# Patient Record
Sex: FEMALE | Race: WHITE | NOT HISPANIC OR LATINO | URBAN - METROPOLITAN AREA
[De-identification: names, ages, dates, MRNs, and addresses within clinical notes are randomized per-mention and may not be internally consistent; named-entity substitution may affect disease eponyms.]

---

## 2024-06-15 ENCOUNTER — HOSPITAL ENCOUNTER (EMERGENCY)
Facility: MEDICAL CENTER | Age: 29
End: 2024-06-15
Attending: STUDENT IN AN ORGANIZED HEALTH CARE EDUCATION/TRAINING PROGRAM

## 2024-06-15 VITALS
TEMPERATURE: 97.4 F | BODY MASS INDEX: 18.94 KG/M2 | RESPIRATION RATE: 16 BRPM | WEIGHT: 125 LBS | SYSTOLIC BLOOD PRESSURE: 119 MMHG | HEART RATE: 62 BPM | OXYGEN SATURATION: 99 % | DIASTOLIC BLOOD PRESSURE: 79 MMHG | HEIGHT: 68 IN

## 2024-06-15 DIAGNOSIS — R53.83 FATIGUE, UNSPECIFIED TYPE: ICD-10-CM

## 2024-06-15 LAB
ALBUMIN SERPL BCP-MCNC: 4.4 G/DL (ref 3.2–4.9)
ALBUMIN/GLOB SERPL: 1.7 G/DL
ALP SERPL-CCNC: 93 U/L (ref 30–99)
ALT SERPL-CCNC: 13 U/L (ref 2–50)
ANION GAP SERPL CALC-SCNC: 10 MMOL/L (ref 7–16)
APPEARANCE UR: CLEAR
AST SERPL-CCNC: 14 U/L (ref 12–45)
B-HCG SERPL-ACNC: <1 MIU/ML (ref 0–10)
BASOPHILS # BLD AUTO: 0.6 % (ref 0–1.8)
BASOPHILS # BLD: 0.04 K/UL (ref 0–0.12)
BILIRUB SERPL-MCNC: 0.2 MG/DL (ref 0.1–1.5)
BILIRUB UR QL STRIP.AUTO: NEGATIVE
BUN SERPL-MCNC: 17 MG/DL (ref 8–22)
CALCIUM ALBUM COR SERPL-MCNC: 8.9 MG/DL (ref 8.5–10.5)
CALCIUM SERPL-MCNC: 9.2 MG/DL (ref 8.4–10.2)
CHLORIDE SERPL-SCNC: 103 MMOL/L (ref 96–112)
CO2 SERPL-SCNC: 26 MMOL/L (ref 20–33)
COLOR UR: YELLOW
CREAT SERPL-MCNC: 0.58 MG/DL (ref 0.5–1.4)
EKG IMPRESSION: NORMAL
EOSINOPHIL # BLD AUTO: 0.33 K/UL (ref 0–0.51)
EOSINOPHIL NFR BLD: 5.1 % (ref 0–6.9)
ERYTHROCYTE [DISTWIDTH] IN BLOOD BY AUTOMATED COUNT: 43.8 FL (ref 35.9–50)
GFR SERPLBLD CREATININE-BSD FMLA CKD-EPI: 125 ML/MIN/1.73 M 2
GLOBULIN SER CALC-MCNC: 2.6 G/DL (ref 1.9–3.5)
GLUCOSE SERPL-MCNC: 82 MG/DL (ref 65–99)
GLUCOSE UR STRIP.AUTO-MCNC: NEGATIVE MG/DL
HCT VFR BLD AUTO: 45.1 % (ref 37–47)
HGB BLD-MCNC: 14.8 G/DL (ref 12–16)
IMM GRANULOCYTES # BLD AUTO: 0.01 K/UL (ref 0–0.11)
IMM GRANULOCYTES NFR BLD AUTO: 0.2 % (ref 0–0.9)
KETONES UR STRIP.AUTO-MCNC: NEGATIVE MG/DL
LEUKOCYTE ESTERASE UR QL STRIP.AUTO: NEGATIVE
LYMPHOCYTES # BLD AUTO: 2.54 K/UL (ref 1–4.8)
LYMPHOCYTES NFR BLD: 39.2 % (ref 22–41)
MCH RBC QN AUTO: 30 PG (ref 27–33)
MCHC RBC AUTO-ENTMCNC: 32.8 G/DL (ref 32.2–35.5)
MCV RBC AUTO: 91.3 FL (ref 81.4–97.8)
MICRO URNS: NORMAL
MONOCYTES # BLD AUTO: 0.38 K/UL (ref 0–0.85)
MONOCYTES NFR BLD AUTO: 5.9 % (ref 0–13.4)
NEUTROPHILS # BLD AUTO: 3.18 K/UL (ref 1.82–7.42)
NEUTROPHILS NFR BLD: 49 % (ref 44–72)
NITRITE UR QL STRIP.AUTO: NEGATIVE
NRBC # BLD AUTO: 0 K/UL
NRBC BLD-RTO: 0 /100 WBC (ref 0–0.2)
PH UR STRIP.AUTO: 7 [PH] (ref 5–8)
PLATELET # BLD AUTO: 219 K/UL (ref 164–446)
PMV BLD AUTO: 9.4 FL (ref 9–12.9)
POTASSIUM SERPL-SCNC: 4 MMOL/L (ref 3.6–5.5)
PROT SERPL-MCNC: 7 G/DL (ref 6–8.2)
PROT UR QL STRIP: NEGATIVE MG/DL
RBC # BLD AUTO: 4.94 M/UL (ref 4.2–5.4)
RBC UR QL AUTO: NEGATIVE
SODIUM SERPL-SCNC: 139 MMOL/L (ref 135–145)
SP GR UR STRIP.AUTO: 1.01
TROPONIN T SERPL-MCNC: <6 NG/L (ref 6–19)
WBC # BLD AUTO: 6.5 K/UL (ref 4.8–10.8)

## 2024-06-15 PROCEDURE — 80053 COMPREHEN METABOLIC PANEL: CPT

## 2024-06-15 PROCEDURE — 99284 EMERGENCY DEPT VISIT MOD MDM: CPT

## 2024-06-15 PROCEDURE — 700111 HCHG RX REV CODE 636 W/ 250 OVERRIDE (IP): Performed by: STUDENT IN AN ORGANIZED HEALTH CARE EDUCATION/TRAINING PROGRAM

## 2024-06-15 PROCEDURE — 96374 THER/PROPH/DIAG INJ IV PUSH: CPT

## 2024-06-15 PROCEDURE — 700105 HCHG RX REV CODE 258: Performed by: STUDENT IN AN ORGANIZED HEALTH CARE EDUCATION/TRAINING PROGRAM

## 2024-06-15 PROCEDURE — 93005 ELECTROCARDIOGRAM TRACING: CPT | Performed by: STUDENT IN AN ORGANIZED HEALTH CARE EDUCATION/TRAINING PROGRAM

## 2024-06-15 PROCEDURE — 36415 COLL VENOUS BLD VENIPUNCTURE: CPT

## 2024-06-15 PROCEDURE — 81003 URINALYSIS AUTO W/O SCOPE: CPT

## 2024-06-15 PROCEDURE — 85025 COMPLETE CBC W/AUTO DIFF WBC: CPT

## 2024-06-15 PROCEDURE — 84484 ASSAY OF TROPONIN QUANT: CPT

## 2024-06-15 PROCEDURE — 84702 CHORIONIC GONADOTROPIN TEST: CPT

## 2024-06-15 RX ORDER — DIPHENHYDRAMINE HYDROCHLORIDE 50 MG/ML
25 INJECTION INTRAMUSCULAR; INTRAVENOUS ONCE
Status: DISCONTINUED | OUTPATIENT
Start: 2024-06-15 | End: 2024-06-15

## 2024-06-15 RX ORDER — SODIUM CHLORIDE, SODIUM LACTATE, POTASSIUM CHLORIDE, CALCIUM CHLORIDE 600; 310; 30; 20 MG/100ML; MG/100ML; MG/100ML; MG/100ML
1000 INJECTION, SOLUTION INTRAVENOUS ONCE
Status: COMPLETED | OUTPATIENT
Start: 2024-06-15 | End: 2024-06-15

## 2024-06-15 RX ORDER — PROCHLORPERAZINE EDISYLATE 5 MG/ML
10 INJECTION INTRAMUSCULAR; INTRAVENOUS ONCE
Status: DISCONTINUED | OUTPATIENT
Start: 2024-06-15 | End: 2024-06-15

## 2024-06-15 RX ORDER — ACETAMINOPHEN 10 MG/ML
1000 INJECTION, SOLUTION INTRAVENOUS ONCE
Status: COMPLETED | OUTPATIENT
Start: 2024-06-15 | End: 2024-06-15

## 2024-06-15 RX ADMIN — SODIUM CHLORIDE, POTASSIUM CHLORIDE, SODIUM LACTATE AND CALCIUM CHLORIDE 1000 ML: 600; 310; 30; 20 INJECTION, SOLUTION INTRAVENOUS at 21:06

## 2024-06-15 RX ADMIN — ACETAMINOPHEN 1000 MG: 1000 INJECTION INTRAVENOUS at 21:06

## 2024-06-16 NOTE — ED NOTES
Pt given discharge instructions with language line ; pt verbalized understanding of instructions.  Ambulated out to lobby; will take taxi home

## 2024-06-16 NOTE — ED TRIAGE NOTES
"Chief Complaint   Patient presents with    Fatigue     X3 days    Dizziness     X3 days, headache that radiates from from the R side of head      /75   Pulse 82   Temp 36.3 °C (97.3 °F) (Temporal)   Resp 16   Ht 1.727 m (5' 8\")   Wt 56.7 kg (125 lb)   LMP 06/08/2024   SpO2 96%   BMI 19.01 kg/m²     Pt BIB family for above, pt is visiting [place] from [place], they have been here for the past week.   "

## 2024-06-16 NOTE — ED PROVIDER NOTES
ED Provider Note    CHIEF COMPLAINT  Chief Complaint   Patient presents with    Fatigue     X3 days    Dizziness     X3 days, headache that radiates from from the R side of head        EXTERNAL RECORDS REVIEWED  No external notes available    HPI/ROS  LIMITATION TO HISTORY   Select: Language Citizen of [place],  Used   OUTSIDE HISTORIAN(S):  none    Dorita Diaz is a 29 y.o. female with no past medical history presenting to the emergency department for generalized weakness and fatigue over the last 3 days.  Patient lives in the [place] area, is originally from [place], has been in the [place] for the last 4 years.  Says that she came up to [place] about a week ago to visit family, for the last 3 to 4 days she has been having generalized weakness and lightheadedness.  In the emergency department right now she has a mild headache on the right side of her head.  She denies any similar symptoms at altitude in the past, says that she was recently in [place] for approximately 1 month  Denies any infectious symptoms, fever, chills, neck pain, chest pain, shortness of breath, cough.  Denies any dysuria or flank pain.    No lateralizing numbness, weakness, change in speech or swallow, clumsiness, diplopia.    Patient is currently breast-feeding, gave birth back in December 2022        PAST MEDICAL HISTORY       SURGICAL HISTORY  patient denies any surgical history    FAMILY HISTORY  History reviewed. No pertinent family history.    SOCIAL HISTORY  Social History     Tobacco Use    Smoking status: Never    Smokeless tobacco: Never   Vaping Use    Vaping status: Never Used   Substance and Sexual Activity    Alcohol use: Never    Drug use: Never    Sexual activity: Not on file       CURRENT MEDICATIONS  Home Medications       Reviewed by Camille Rosado R.N. (Registered Nurse) on 06/15/24 at 1940  Med List Status: Not Addressed     Medication Last Dose Status        Patient Farzad Taking any Medications                 "           ALLERGIES  No Known Allergies    PHYSICAL EXAM  VITAL SIGNS: /79   Pulse 62   Temp 36.3 °C (97.4 °F) (Temporal)   Resp 16   Ht 1.727 m (5' 8\")   Wt 56.7 kg (125 lb)   LMP 2024   SpO2 99%   Breastfeeding Yes   BMI 19.01 kg/m²    General: Well- appearing , non-toxic, no acute distress  Neuro: oriented x 3, no nystagmus.  Cranial nerves II through XII grossly intact.  Moving all extremities.   HEENT:   - Head: Normocephalic, atraumatic  - Eyes: PERRL  - Ears/Nose: normal external nose and ears  - Mouth: moist mucosal membranes  Resp: clear to auscultation, no increased work of breathing  CV: Regular rate and rhythm  Abd: Soft, non-tender, non-distended  : No CVA tenderness to percussion.  Back: No midline tenderness palpation percussion.  Extremities: No peripheral edema 5/5 strength in upper and lower extremities.  Sensation intact light touch in all aspects of all 4 extremities.  Psych: lucid and conversational         DIAGNOSTIC STUDIES / PROCEDURES    EKG  My independent EKG interpretation:  Results for orders placed or performed during the hospital encounter of 06/15/24   EKG (NOW)   Result Value Ref Range    Report       [facility] Emergency Dept.    Test Date:  2024-06-15  Pt Name:    ALMITA MIGUEL           Department: [facility]  MRN:        [id number]                      [facility]  Gender:     Female                       Technician: [id number]  :        1995                   Requested By:JOSH VELA  Order #:    [id number]                    Reading MD:    Measurements  Intervals                                Axis  Rate:       62                           P:          67  OR:         135                          QRS:        95  QRSD:       88                           T:          61  QT:         400  QTc:        407    Interpretive Statements  Sinus rhythm  Borderline right axis deviation  Baseline wander in lead(s) I,III,aVL  No " previous ECG available for comparison         LABS  Results for orders placed or performed during the hospital encounter of 06/15/24   CBC WITH DIFFERENTIAL   Result Value Ref Range    WBC 6.5 4.8 - 10.8 K/uL    RBC 4.94 4.20 - 5.40 M/uL    Hemoglobin 14.8 12.0 - 16.0 g/dL    Hematocrit 45.1 37.0 - 47.0 %    MCV 91.3 81.4 - 97.8 fL    MCH 30.0 27.0 - 33.0 pg    MCHC 32.8 32.2 - 35.5 g/dL    RDW 43.8 35.9 - 50.0 fL    Platelet Count 219 164 - 446 K/uL    MPV 9.4 9.0 - 12.9 fL    Neutrophils-Polys 49.00 44.00 - 72.00 %    Lymphocytes 39.20 22.00 - 41.00 %    Monocytes 5.90 0.00 - 13.40 %    Eosinophils 5.10 0.00 - 6.90 %    Basophils 0.60 0.00 - 1.80 %    Immature Granulocytes 0.20 0.00 - 0.90 %    Nucleated RBC 0.00 0.00 - 0.20 /100 WBC    Neutrophils (Absolute) 3.18 1.82 - 7.42 K/uL    Lymphs (Absolute) 2.54 1.00 - 4.80 K/uL    Monos (Absolute) 0.38 0.00 - 0.85 K/uL    Eos (Absolute) 0.33 0.00 - 0.51 K/uL    Baso (Absolute) 0.04 0.00 - 0.12 K/uL    Immature Granulocytes (abs) 0.01 0.00 - 0.11 K/uL    NRBC (Absolute) 0.00 K/uL   COMP METABOLIC PANEL   Result Value Ref Range    Sodium 139 135 - 145 mmol/L    Potassium 4.0 3.6 - 5.5 mmol/L    Chloride 103 96 - 112 mmol/L    Co2 26 20 - 33 mmol/L    Anion Gap 10.0 7.0 - 16.0    Glucose 82 65 - 99 mg/dL    Bun 17 8 - 22 mg/dL    Creatinine 0.58 0.50 - 1.40 mg/dL    Calcium 9.2 8.4 - 10.2 mg/dL    Correct Calcium 8.9 8.5 - 10.5 mg/dL    AST(SGOT) 14 12 - 45 U/L    ALT(SGPT) 13 2 - 50 U/L    Alkaline Phosphatase 93 30 - 99 U/L    Total Bilirubin 0.2 0.1 - 1.5 mg/dL    Albumin 4.4 3.2 - 4.9 g/dL    Total Protein 7.0 6.0 - 8.2 g/dL    Globulin 2.6 1.9 - 3.5 g/dL    A-G Ratio 1.7 g/dL   TROPONIN   Result Value Ref Range    Troponin T <6 6 - 19 ng/L   URINALYSIS (UA)    Specimen: Blood   Result Value Ref Range    Color Yellow     Character Clear     Specific Gravity 1.010 <1.035    Ph 7.0 5.0 - 8.0    Glucose Negative Negative mg/dL    Ketones Negative Negative mg/dL     Protein Negative Negative mg/dL    Bilirubin Negative Negative    Nitrite Negative Negative    Leukocyte Esterase Negative Negative    Occult Blood Negative Negative    Micro Urine Req see below    HCG QUANTITATIVE SERUM   Result Value Ref Range    Bhcg <1.0 0.0 - 10.0 mIU/mL   ESTIMATED GFR   Result Value Ref Range    GFR (CKD-EPI) 125 >60 mL/min/1.73 m 2   EKG (NOW)   Result Value Ref Range    Report       [facility] Emergency Dept.    Test Date:  2024-06-15  Pt Name:    ALMITA DIAZ           Department[facility]  MRN:        [id number]                      [facility]  Gender:     Female                       Technician: [id number]  :        1995                   Requested By:JOSH VELA  Order #:    [id number]                    Reading MD:    Measurements  Intervals                                Axis  Rate:       62                           P:          67  DE:         135                          QRS:        95  QRSD:       88                           T:          61  QT:         400  QTc:        407    Interpretive Statements  Sinus rhythm  Borderline right axis deviation  Baseline wander in lead(s) I,III,aVL  No previous ECG available for comparison         RADIOLOGY  I have independently interpreted the diagnostic imaging associated with this visit and am waiting the final reading from the radiologist.   My preliminary interpretation is as follows:   -   Radiologist interpretation:   No orders to display           MEDICAL DECISION MAKING      ED COURSE AND PLAN    Almita Diaz is a 29 y.o. female with above medical history presenting to the emergency department for evaluation of generalized weakness, lightheadedness present for the last 3 days.  On evaluation in the emergency department, vital signs are stable, patient is overall well-appearing with no lateralizing or focal neurologic deficits on exam.  Overall her physical exam is reassuring.  Considered  broad differential for her symptoms including acute mountain sickness, anemia, electrolyte abnormality, renal dysfunction, dehydration, peripheral neuropathy, central neurologic process, intracranial process, GBS among others.  Her headache is mild, does not seem consistent with subarachnoid hemorrhage, CNS infection, IIH.     Will plan to hydrate patient with IV fluids, treat her headache with IV Tylenol (patient is breast-feeding) obtain baseline labs, urinalysis, EKG, observe patient in the emergency department for a couple of hours.    ---Pertinent ED Course---:    8:44 PM I reviewed the patient's old records in Epic, medication list, allergies, past medical history and performed a physical examination.     10:30 PM labs urine EKG all returned within normal limits.  I reevaluated patient after treatment with IV fluids and IV Tylenol, she is feeling slightly better.  She has a reassuring physical exam, she is ambulatory without any evidence of ataxia.  Unclear specific cause to her symptoms but no indication for further diagnostic testing, diagnostic imaging, admission to the hospital.  Patient is appropriate for discharge home, she is comfortable with the above plan.  Advised on strict return precautions the emergency department for worsening symptoms.    Procedures:      ----------------------------------------------------------------------------------  DISCUSSIONS    I have discussed management of the patient with the following physicians and HAFSA's:      Discussion of management with other [facility] or appropriate source(s):     Escalation of care considered, and ultimately not performed:  Considered but no indication for imaging of the head    Barriers to care at this time, including but not limited to:     Decision tools and prescription drugs considered including, but not limited to:     FINAL IMPRESSION    1. Fatigue, unspecified type        There are no discharge medications for this  patient.        DISPOSITION    Discharge home, Stable        This chart was dictated using an electronic voice recognition software. The chart has been reviewed and edited but there is still possibility for dictation errors due to limitation of software.    Josiah Gutierrez DO 6/15/2024

## 2024-06-16 NOTE — DISCHARGE INSTRUCTIONS
??? ???????? ? ????????? ?????????? ?????? ?? ?????? ????? ???????? ? ????????????.  ???? ???????, ?????? ????, ??? ???? ??? ????????????.  ??? ???? ?? ???????????? ???????? ?????????????.    ??????? ?? ??????????? ????????????? ???????? ? ??????? ????????? ?????????? ????. ?????????? ???????? ?? ???????? ????.  ????????? ????? ??? ????????? ?????????.  Vas posetili v otdelenii neotlozhnoy pomoshchi po povodu obshchey slabosti i utomlyayemosti.  Vashi analizy, jacob mochi, EKG byli bez osobennostey.  Vash test na beremennost' okazalsya otritsatel'nym.    Sledite za dostatochnym upotrebleniyem zhidkosti v techeniye sleduyushchikh neskol'kikh dney. Prinimayte Taylenol ot golovnoy boli.  Prikhodite snova darby ukhudshenii simptomov.    You were seen in the emergency department for generalized weakness and fatigue  Your labs, urinalysis, EKG were unremarkable.  Your pregnancy test was negative.    Keep yourself well-hydrated over the next several days, take Tylenol for your headache.  Come back for worsening symptoms.